# Patient Record
Sex: MALE | RURAL
[De-identification: names, ages, dates, MRNs, and addresses within clinical notes are randomized per-mention and may not be internally consistent; named-entity substitution may affect disease eponyms.]

---

## 2018-11-19 ENCOUNTER — APPOINTMENT (RX ONLY)
Dept: RURAL CLINIC 1 | Facility: CLINIC | Age: 48
Setting detail: DERMATOLOGY
End: 2018-11-19

## 2018-11-19 VITALS — HEIGHT: 70 IN | WEIGHT: 165 LBS

## 2018-11-19 PROBLEM — L29.8 OTHER PRURITUS: Status: ACTIVE | Noted: 2018-11-19

## 2018-11-19 PROBLEM — E78.5 HYPERLIPIDEMIA, UNSPECIFIED: Status: ACTIVE | Noted: 2018-11-19

## 2018-11-19 PROBLEM — L85.3 XEROSIS CUTIS: Status: ACTIVE | Noted: 2018-11-19

## 2018-11-19 PROBLEM — J30.1 ALLERGIC RHINITIS DUE TO POLLEN: Status: ACTIVE | Noted: 2018-11-19

## 2018-11-19 PROBLEM — D48.5 NEOPLASM OF UNCERTAIN BEHAVIOR OF SKIN: Status: ACTIVE | Noted: 2018-11-19

## 2018-11-19 PROCEDURE — 11100: CPT

## 2018-11-19 PROCEDURE — ? BIOPSY BY SHAVE METHOD

## 2018-11-19 NOTE — PROCEDURE: BIOPSY BY SHAVE METHOD
Body Location Override (Optional - Billing Will Still Be Based On Selected Body Map Location If Applicable): right mid interior tibia